# Patient Record
Sex: MALE | Race: WHITE | NOT HISPANIC OR LATINO | Employment: FULL TIME | URBAN - METROPOLITAN AREA
[De-identification: names, ages, dates, MRNs, and addresses within clinical notes are randomized per-mention and may not be internally consistent; named-entity substitution may affect disease eponyms.]

---

## 2024-03-01 ENCOUNTER — APPOINTMENT (OUTPATIENT)
Dept: ULTRASOUND IMAGING | Facility: HOSPITAL | Age: 24
End: 2024-03-01
Payer: MEDICAID

## 2024-03-01 ENCOUNTER — HOSPITAL ENCOUNTER (EMERGENCY)
Facility: HOSPITAL | Age: 24
Discharge: HOME | End: 2024-03-01
Attending: EMERGENCY MEDICINE
Payer: MEDICAID

## 2024-03-01 VITALS
OXYGEN SATURATION: 99 % | HEIGHT: 72 IN | RESPIRATION RATE: 16 BRPM | HEART RATE: 84 BPM | DIASTOLIC BLOOD PRESSURE: 82 MMHG | TEMPERATURE: 97.7 F | BODY MASS INDEX: 20.32 KG/M2 | SYSTOLIC BLOOD PRESSURE: 120 MMHG | WEIGHT: 150 LBS

## 2024-03-01 DIAGNOSIS — N50.811 PAIN IN RIGHT TESTICLE: Primary | ICD-10-CM

## 2024-03-01 PROCEDURE — 93976 VASCULAR STUDY: CPT

## 2024-03-01 PROCEDURE — 76870 US EXAM SCROTUM: CPT

## 2024-03-01 PROCEDURE — 99284 EMERGENCY DEPT VISIT MOD MDM: CPT

## 2024-03-01 RX ORDER — METHOCARBAMOL 750 MG/1
750 TABLET, FILM COATED ORAL 3 TIMES DAILY PRN
Qty: 20 TABLET | Refills: 0 | Status: SHIPPED | OUTPATIENT
Start: 2024-03-01

## 2024-03-01 NOTE — DISCHARGE INSTRUCTIONS
Follow-up with urology if symptoms continue.  Return to the emergency room for any new or worsening symptoms or if you have any other questions or concerns.  Take medication as prescribed.

## 2024-03-01 NOTE — ED PROVIDER NOTES
"Subjective   History of Present Illness  Chief complaint: Right testicle pain    23-year-old male presents with right testicle pain.  Patient states he has noticed this for about a week and it has gotten progressively worse.  He states pain sometimes radiates into the abdomen.  He denies any nausea, vomiting, diarrhea.  He has had no dysuria or hematuria.  He denies any penile discharge.  He denies any testicular swelling or redness.  He has had no fever.    History provided by:  Patient      Review of Systems   Constitutional:  Negative for fever.   HENT:  Negative for congestion.    Respiratory:  Negative for cough and shortness of breath.    Cardiovascular:  Negative for chest pain.   Gastrointestinal:  Positive for abdominal pain. Negative for diarrhea, nausea and vomiting.   Genitourinary:  Positive for testicular pain. Negative for dysuria and penile discharge.   Musculoskeletal:  Negative for back pain.   Neurological:  Negative for headaches.   Psychiatric/Behavioral:  Negative for confusion.        No past medical history on file.    No Known Allergies    No past surgical history on file.    No family history on file.    Social History     Socioeconomic History    Marital status: Single       /62 (BP Location: Left arm, Patient Position: Sitting)   Pulse 97   Temp 97.7 °F (36.5 °C) (Oral)   Resp 20   Ht 182.9 cm (72\")   Wt 68 kg (150 lb)   SpO2 100%   BMI 20.34 kg/m²       Objective   Physical Exam  Vitals and nursing note reviewed.   Constitutional:       Appearance: Normal appearance.   HENT:      Head: Normocephalic and atraumatic.      Mouth/Throat:      Mouth: Mucous membranes are moist.   Cardiovascular:      Rate and Rhythm: Normal rate and regular rhythm.      Heart sounds: Normal heart sounds.   Pulmonary:      Effort: Pulmonary effort is normal. No respiratory distress.      Breath sounds: Normal breath sounds.   Abdominal:      Palpations: Abdomen is soft.      Tenderness: There is " no abdominal tenderness.   Genitourinary:     Comments:  exam is unremarkable.  There is no significant tenderness of the right testicle.  There is no appreciable swelling or erythema.  There is no penile discharge.  No skin lesions.  Skin:     General: Skin is warm and dry.   Neurological:      Mental Status: He is alert and oriented to person, place, and time.         Procedures           ED Course      US Scrotum & Testicles    Result Date: 3/1/2024  Impression: 1.No acute process nor significant abnormality identified. Electronically Signed: Martin Raymundo MD  3/1/2024 6:40 PM EST  Workstation ID: [id number]                                          Medical Decision Making  Amount and/or Complexity of Data Reviewed  Radiology: ordered.      Scrotal ultrasound was unremarkable.  On further discussion with the patient he states he thinks he may have strained his groin and could be having muscle spasms.  He will be given a prescription for Robaxin.  He will be given urology follow-up if his testicular pain continues.  He is stable for discharge.      Final diagnoses:   Pain in right testicle       ED Disposition  ED Disposition       ED Disposition   Discharge    Condition   Stable    Comment   --               Jerzy Ku MD  [address] [place] [address] [place] [facility]  [phone]    Call in 2 days           Medication List        New Prescriptions      methocarbamol 750 MG tablet  Commonly known as: ROBAXIN  Take 1 tablet by mouth 3 (Three) Times a Day As Needed for Muscle Spasms.               Where to Get Your Medications        These medications were sent to [facility] [address] [place] [address] [place] - [address] [facility] - [phone]  - [phone] FX  [address] [facility] [place] [address]      Phone: [phone]   methocarbamol 750 MG tablet            Carmelo Mckee MD  03/01/24 0869